# Patient Record
Sex: FEMALE | Race: ASIAN | NOT HISPANIC OR LATINO
[De-identification: names, ages, dates, MRNs, and addresses within clinical notes are randomized per-mention and may not be internally consistent; named-entity substitution may affect disease eponyms.]

---

## 2017-09-12 PROBLEM — Z00.00 ENCOUNTER FOR PREVENTIVE HEALTH EXAMINATION: Status: ACTIVE | Noted: 2017-09-12

## 2017-09-19 ENCOUNTER — APPOINTMENT (OUTPATIENT)
Dept: CARDIOLOGY | Facility: CLINIC | Age: 67
End: 2017-09-19
Payer: MEDICARE

## 2017-09-19 VITALS
WEIGHT: 107 LBS | RESPIRATION RATE: 17 BRPM | OXYGEN SATURATION: 99 % | BODY MASS INDEX: 20.46 KG/M2 | SYSTOLIC BLOOD PRESSURE: 131 MMHG | HEIGHT: 60.63 IN | DIASTOLIC BLOOD PRESSURE: 75 MMHG | TEMPERATURE: 98.5 F | HEART RATE: 55 BPM

## 2017-09-19 DIAGNOSIS — R00.2 PALPITATIONS: ICD-10-CM

## 2017-09-19 DIAGNOSIS — Z82.49 FAMILY HISTORY OF ISCHEMIC HEART DISEASE AND OTHER DISEASES OF THE CIRCULATORY SYSTEM: ICD-10-CM

## 2017-09-19 DIAGNOSIS — R94.31 ABNORMAL ELECTROCARDIOGRAM [ECG] [EKG]: ICD-10-CM

## 2017-09-19 PROCEDURE — 93306 TTE W/DOPPLER COMPLETE: CPT

## 2017-09-19 PROCEDURE — ZZZZZ: CPT

## 2017-09-19 PROCEDURE — 93015 CV STRESS TEST SUPVJ I&R: CPT

## 2017-09-19 PROCEDURE — 99204 OFFICE O/P NEW MOD 45 MIN: CPT | Mod: 25

## 2018-08-22 PROBLEM — R94.31 ABNORMAL ECG: Status: ACTIVE | Noted: 2017-09-19

## 2023-12-15 ENCOUNTER — APPOINTMENT (OUTPATIENT)
Dept: CARDIOLOGY | Facility: CLINIC | Age: 73
End: 2023-12-15
Payer: MEDICARE

## 2023-12-15 ENCOUNTER — NON-APPOINTMENT (OUTPATIENT)
Age: 73
End: 2023-12-15

## 2023-12-15 VITALS
BODY MASS INDEX: 19.13 KG/M2 | HEART RATE: 57 BPM | TEMPERATURE: 97.8 F | WEIGHT: 100 LBS | RESPIRATION RATE: 16 BRPM | SYSTOLIC BLOOD PRESSURE: 157 MMHG | DIASTOLIC BLOOD PRESSURE: 93 MMHG | OXYGEN SATURATION: 98 %

## 2023-12-15 DIAGNOSIS — R06.02 SHORTNESS OF BREATH: ICD-10-CM

## 2023-12-15 DIAGNOSIS — R42 DIZZINESS AND GIDDINESS: ICD-10-CM

## 2023-12-15 DIAGNOSIS — R07.89 OTHER CHEST PAIN: ICD-10-CM

## 2023-12-15 DIAGNOSIS — Z86.39 PERSONAL HISTORY OF OTHER ENDOCRINE, NUTRITIONAL AND METABOLIC DISEASE: ICD-10-CM

## 2023-12-15 DIAGNOSIS — I10 ESSENTIAL (PRIMARY) HYPERTENSION: ICD-10-CM

## 2023-12-15 PROCEDURE — 99204 OFFICE O/P NEW MOD 45 MIN: CPT

## 2023-12-15 PROCEDURE — 93000 ELECTROCARDIOGRAM COMPLETE: CPT

## 2023-12-15 RX ORDER — OLMESARTAN MEDOXOMIL 5 MG/1
5 TABLET, FILM COATED ORAL
Qty: 90 | Refills: 1 | Status: ACTIVE | COMMUNITY
Start: 2023-12-15 | End: 1900-01-01

## 2023-12-15 NOTE — REVIEW OF SYSTEMS
[SOB] : shortness of breath [Chest Discomfort] : chest discomfort [Dizziness] : dizziness [Negative] : Heme/Lymph

## 2023-12-29 ENCOUNTER — APPOINTMENT (OUTPATIENT)
Dept: CARDIOLOGY | Facility: CLINIC | Age: 73
End: 2023-12-29
Payer: MEDICARE

## 2023-12-29 VITALS — SYSTOLIC BLOOD PRESSURE: 146 MMHG | DIASTOLIC BLOOD PRESSURE: 81 MMHG | TEMPERATURE: 97.8 F

## 2023-12-29 PROCEDURE — 93306 TTE W/DOPPLER COMPLETE: CPT

## 2024-01-01 NOTE — REASON FOR VISIT
[Symptom and Test Evaluation] : symptom and test evaluation [FreeTextEntry1] : 73 year old F with ?pAF on Xarelto, HTN, HLD who presents for cardiac evaluation.  She is on Xarelto 20mg daily for many years (started by different cardiologist). She is on Toprol 25mg daily, olmesartan 5mg daily, atorva 10mg daily.  Last seen by Dr. Lakhani 9/19/17 for CP: Patient underwent an echocardiogram and it showed normal LV function without significant valvular pathology. Patient underwent a treadmill stress test and completed 5 minutes of Naif protocol. There were upsloping ST depressions on ECG but no symptoms. Patient appears to be at low risk for having significant CAD. Patient was reassured. I will consider additional workup if her symptom persists.

## 2024-01-01 NOTE — HISTORY OF PRESENT ILLNESS
[FreeTextEntry1] : Pt was seeing a different cardiologist for a long time. She was told to take Xarelto 20mg daily but doesn't remember why. She then switched to Dr. Chao and has been following with him until recently when her insurance switched. She reports occasional dizziness and episodes of chest discomfort/SOB without clear triggers over the past 1-2 months. No orthopnea, PND, LE edema, palpitations, or LOC. She was told by another doctor her HR is a little slow.

## 2024-01-01 NOTE — ASSESSMENT
[FreeTextEntry1] : 73 year old F with ?pAF on Xarelto, HTN, HLD who presents for cardiac evaluation.  She is on Xarelto 20mg daily for many years (started by different cardiologist). She is on Toprol 25mg daily, olmesartan 5mg daily, atorva 10mg daily.  Pt was seeing a different cardiologist for a long time. She was told to take Xarelto 20mg daily but doesn't remember why. She then switched to Dr. Chao and has been following with him until recently when her insurance switched. She reports occasional dizziness and episodes of chest discomfort/SOB without clear triggers over the past 1-2 months. No orthopnea, PND, LE edema, palpitations, or LOC. She was told by another doctor her HR is a little slow.  1) Chest discomfort, atypical in nature, random 2) SOB 3) Dizziness - EKG 12/15/23 showed sinus bradycardia with RBBB - Her symptoms are atypical in nature and occurs randomly. Pt wants to defer stress testing at this time, will check with Dr. Chao (last cardiologist) for previous testing. - I advised pt to undergo TTE 12/29/23 which showed hyperdynamic LVEF 70-75% with normal RV size/function, normal LA size, mild MR, mild TR, and no AI - Check 1 week event monitor to assess for pAF. Pt declined event monitor at this time. - She is on Xarelto 20mg daily as prescribed by previous cardiologist  4) HTN, HLD - continue current meds for now  5) Follow-up, 6 months or sooner if symptomatic

## 2024-06-28 ENCOUNTER — APPOINTMENT (OUTPATIENT)
Dept: CARDIOLOGY | Facility: CLINIC | Age: 74
End: 2024-06-28